# Patient Record
Sex: FEMALE | Race: WHITE | Employment: FULL TIME | URBAN - METROPOLITAN AREA
[De-identification: names, ages, dates, MRNs, and addresses within clinical notes are randomized per-mention and may not be internally consistent; named-entity substitution may affect disease eponyms.]

---

## 2021-08-02 ENCOUNTER — APPOINTMENT (OUTPATIENT)
Dept: PHYSICAL THERAPY | Age: 61
End: 2021-08-02

## 2021-08-16 ENCOUNTER — TRANSCRIBE ORDER (OUTPATIENT)
Dept: SCHEDULING | Age: 61
End: 2021-08-16

## 2021-08-16 DIAGNOSIS — R60.0 BILATERAL LOWER EXTREMITY EDEMA: Primary | ICD-10-CM

## 2022-05-25 ENCOUNTER — HOSPITAL ENCOUNTER (OUTPATIENT)
Dept: PHYSICAL THERAPY | Age: 62
Discharge: HOME | End: 2022-05-25
Payer: COMMERCIAL

## 2022-05-25 PROCEDURE — 97161 PT EVAL LOW COMPLEX 20 MIN: CPT | Performed by: PHYSICAL THERAPIST

## 2022-05-25 PROCEDURE — 97110 THERAPEUTIC EXERCISES: CPT | Performed by: PHYSICAL THERAPIST

## 2022-05-25 NOTE — PROGRESS NOTES
[address], [address] [place] [address] [place] [address]  Phone: [phone]   Fax: [phone]    PT INITIAL EVALUATION NOTE - [facility] 2-15  Plan of Care/Statement of Necessity for Physical Therapy Services  2-15    Patient Name: You Garza  Date:2022  : 1960  [x]  Patient  Verified  Provider#: [id number]  Payor: JUAQUIN CARDONA / Plan: Vivi Scott / Product Type: RUPA /    Referral source: Bradleyregaan Harris. Chelsi DO Raymon   In time:250 pm   Out time: 340 pm   Total Treatment Time (min):  50 minutes   Total Timed Codes (min):  10 minutes      Visit #: 1      Start of Care: 2022      Onset Date: 10/21/2022 s/p spinal fusion. Treatment Area/Diagnosis: Right foot drop [M21.371]  Other abnormalities of gait and mobility [R26.89]    SUBJECTIVE  Pain Level (0-10 scale): 7                Best: 4           Worst:  10 : Aggravating factors-   Description:  Numbness, tingling. Any medication changes, allergies to medications, adverse drug reactions, diagnosis change, or new procedure performed?: [] No    [x] Yes (see summary sheet for update)    Subjective:    Chief complaint of right dorsal foot pain ,hypersensitivity and deep pressure. Onset after spinal fusion 10/2021. Wears AFO most of the time. PLOF: patient reports she could walk 1/2 mile today. Mechanism of Injury: see above   Previous Treatment/Compliance: AF0  PMHx: CLL, HTN, melanoma, left ORIF lateral malleolus. Surgical Hx: see scanned. Prior Hospitalization: see medical history   Medications: Verified on Patient Summary List  Work Hx:  Full real estate  Living Situation:  Has help of a significant other. Not able to drive.    Pt Goals: walk longer distances,drive  Barriers: none  Motivation: good  Cognition: A & O x 4        OBJECTIVE/EXAMINATION  Ortho:   Observation: WN, slightly OW female  Posture:  Unremarkable  Functional Mobility:        Gait:  Without right AFO, foot slap. Stairs: + handrail , difficulty with reciprocal pattern       Balance:  Left: 10 seconds, right: unable   Palpation: very sensitive to touch over the right dorsal ankle. LE AROM:   Hip AROM:  Right      [] N/A  [x]WNL  []Minimal  []Moderate  [] Major              Left         [] N/A  [x]WNL  []Minimal  []Moderate  [] Major   Ankle DF:  Right: -5, left: 0     Strength:  RLE: Hip abductors:  3/4, ankle DF: 0, EV: -2/5, IV: trace. Toes:  Extension: trace. Flexion: 1/5         LLE:  WNL           10 min Therapeutic Exercise:  [x] See flow sheet :  Seated heel raises, DF band pull followed by PF, seated. Rationale: increase ROM and increase strength to improve the patients ability to move and stabilize ankle with weight bearing activities. With   [x] TE   [] TA   [] neuro   [] other: Patient Education: [x] Review HEP  , see above. [] Progressed/Changed HEP based on:   [] positioning   [] body mechanics   [] transfers   [] heat/ice application    [] other:    TUG: WNL     Pain Level (0-10 scale) post treatment: 7    ASSESSMENT/Key Information/Changes in Function:   Chief complaint of weakness/pain right ankle s/p spine fusion 10/2022. Objective findings include:  Gait abnormality and significant weakness right ankle. Will benefit from skilled PT intervention to facilitate his return to baseline. Problem List: pain affecting function, decrease ROM, decrease strength, impaired gait/ balance and decrease flexibility/ joint mobility    Short Term Goals: To be accomplished in 4 weeks:   1. Inpd with HEP              2. Passive ankle dorsiflexion: 5  Long Term Goals: To be accomplished in 8 weeks:   1. Active ankle DF, observable contraction. Patient Goal (s): walk normally without brace.      Evaluation Complexity History LOW Complexity : Zero comorbidities / personal factors that will impact the outcome / POC; Examination LOW Complexity : 1-2 Standardized tests and measures addressing body structure, function, activity limitation and / or participation in recreation  ;Presentation LOW Complexity : Stable, uncomplicated  ;Clinical Decision Making AM-PAC: 59.19% : Medium Complexity   Overall Complexity Rating: LOW      Patient / Family readiness to learn indicated by: asking questions and interest  Persons(s) to be included in education: patient (P)  Barriers to Learning/Limitations: None  Patient Self Reported Health Status: good  Rehabilitation Potential: good  Patient/ Caregiver education and instruction: exercises      PLAN:  Treatment Plan may include any combination of the following: Therapeutic exercise, Therapeutic activities, Neuromuscular re-education, Gait/balance training, Manual therapy and Patient education  HEP Issued: see above    Frequency / Duration: Patient to be seen 2 times per week for 8 weeks. [x]  Plan of care has been reviewed with PTA    Certification Period: 5/25/2022  to  8/25/2022 April Gualberto, PT 5/25/2022     ________________________________________________________________________    I certify that the above Therapy Services are being furnished while the patient is under my care. I agree with the treatment plan and certify that this therapy is necessary. [de-identified] Signature:____________________  Date:____________Time: _________            Rosa M Knapp

## 2022-06-02 ENCOUNTER — HOSPITAL ENCOUNTER (OUTPATIENT)
Dept: PHYSICAL THERAPY | Age: 62
Discharge: HOME | End: 2022-06-02
Payer: COMMERCIAL

## 2022-06-02 PROCEDURE — 97110 THERAPEUTIC EXERCISES: CPT | Performed by: PHYSICAL THERAPIST

## 2022-06-02 NOTE — PROGRESS NOTES
PT DAILY TREATMENT NOTE - [facility] 2-15    Patient Name: Aileen Loo  WEEC:[id number]  : 1960  [x]  Patient  Verified  Payor: Venu Pederson / Plan: Liza Arboleda / Product Type: RUPA /    In time: 1125 am  Out time:1150 am   Total Treatment Time (min): 25 minutes  Total Timed Codes (min): 23 minutes  1:1 Treatment Time ( [address] only): 23 minutes   Visit #:  2    Treatment Area: Right foot drop [M21.371]  Other abnormalities of gait and mobility [R26.89]    SUBJECTIVE  Pain Level (0-10 scale): 4. Any medication changes, allergies to medications, adverse drug reactions, diagnosis change, or new procedure performed?: [x] No    [] Yes (see summary sheet for update)  Subjective functional status/changes:   [] No changes reported  Pain level has been good today     OBJECTIVE      23  min Therapeutic Exercise:  [] See flow sheet :   Rationale: increase ROM, increase strength and improve balance to improve the patients ability to perform upright activities with less pain . With   [] TE   [] TA   [] neuro   [] other: Patient Education: [x] Review HEP  , ball/band. [] Progressed/Changed HEP based on:   [] positioning   [] body mechanics   [] transfers   [] heat/ice application    [] other:           Pain Level (0-10 scale) post treatment: 4    ASSESSMENT/Changes in Function:   Moderate fatigue with effort. Good form with exercises issued. Alternated sitting down and standing activities. Patient will continue to benefit from skilled PT services to modify and progress therapeutic interventions, address functional mobility deficits, address strength deficits and analyze and cue movement patterns to attain remaining goals. [x]  See Plan of Care  []  See progress note/recertification  []  See Discharge Summary         Progress towards goals / Updated goals:  Short Term Goals: To be accomplished in 4 weeks:              1. Inpd with HEP              2. Passive ankle dorsiflexion: 5  Long Term Goals:  To be accomplished in 8 weeks:              1.  Active ankle DF, observable contraction. Patient Goal (s): walk normally without brace.         PLAN  [x]  Upgrade activities as tolerated     [x]  Continue plan of care  []  Update interventions per flow sheet       []  Discharge due to:_  []  Other:_      April Joy-Rosa, PT 6/2/2022

## 2022-06-06 ENCOUNTER — HOSPITAL ENCOUNTER (OUTPATIENT)
Dept: PHYSICAL THERAPY | Age: 62
Discharge: HOME | End: 2022-06-06
Payer: COMMERCIAL

## 2022-06-06 PROCEDURE — 97530 THERAPEUTIC ACTIVITIES: CPT | Performed by: PHYSICAL THERAPIST

## 2022-06-06 PROCEDURE — 97110 THERAPEUTIC EXERCISES: CPT | Performed by: PHYSICAL THERAPIST

## 2022-06-06 NOTE — PROGRESS NOTES
PT DAILY TREATMENT NOTE - [facility] 2-15    Patient Name: Mansoor Leiva  Date:2022  : 1960  [x]  Patient  Verified  Payor: Jovita Miller / Plan: Corrinne Needs / Product Type: RUPA /    In time: 845 am  Out time: 935 am   Total Treatment Time (min):  50  minutes  Total Timed Codes (min):  40  minutes  1:1 Treatment Time ( only): 40  minutes   Visit #:  3    Treatment Area: Right foot drop [M21.371]  Other abnormalities of gait and mobility [R26.89]    SUBJECTIVE  Pain Level (0-10 scale): 4. Any medication changes, allergies to medications, adverse drug reactions, diagnosis change, or new procedure performed?: [x] No    [] Yes (see summary sheet for update)  Subjective functional status/changes:   [] No changes reported  WAS NOT TOO SORE AFTER THE LAST SESSION. MAYBE IN THE OF THE THIGHS. OBJECTIVE      20  min Therapeutic Exercise:  [] See flow sheet :   Rationale: increase ROM, increase strength and improve balance to improve the patients ability to perform upright activities with less pain . 20 min Therapeutic Activity:  []  See flow sheet :   Rationale: improve coordination and improve balance  to improve the patients ability to AMBULATE WITH A REDUCED FALL RISK          With   [] TE   [] TA   [] neuro   [] other: Patient Education: [x] Review HEP    [] Progressed/Changed HEP based on:   [] positioning   [] body mechanics   [] transfers   [] heat/ice application    [] other:           Pain Level (0-10 scale) post treatment: 4    ASSESSMENT/Changes in Function:   Moderate fatigue with effort. Good form with exercises issued. Alternated sitting down and standing activities. Patient will continue to benefit from skilled PT services to modify and progress therapeutic interventions, address functional mobility deficits, address strength deficits and analyze and cue movement patterns to attain remaining goals.      [x]  See Plan of Care  []  See progress note/recertification  []  See Discharge Summary         Progress towards goals / Updated goals:  Short Term Goals: To be accomplished in 4 weeks:              1. Inpd with HEP              2. Passive ankle dorsiflexion: 5  Long Term Goals: To be accomplished in 8 weeks:              1.  Active ankle DF, observable contraction. Patient Goal (s): walk normally without brace.         PLAN  [x]  Upgrade activities as tolerated     [x]  Continue plan of care  []  Update interventions per flow sheet       []  Discharge due to:_  []  Other:_      April Joy-Rosa, PT 6/6/2022

## 2022-06-08 ENCOUNTER — HOSPITAL ENCOUNTER (OUTPATIENT)
Dept: PHYSICAL THERAPY | Age: 62
Discharge: HOME | End: 2022-06-08
Payer: COMMERCIAL

## 2022-06-08 PROCEDURE — 97530 THERAPEUTIC ACTIVITIES: CPT | Performed by: PHYSICAL THERAPIST

## 2022-06-08 PROCEDURE — 97110 THERAPEUTIC EXERCISES: CPT | Performed by: PHYSICAL THERAPIST

## 2022-06-08 PROCEDURE — 97014 ELECTRIC STIMULATION THERAPY: CPT | Performed by: PHYSICAL THERAPIST

## 2022-06-08 NOTE — PROGRESS NOTES
PT DAILY TREATMENT NOTE - [facility] 2-15    Patient Name: Aileen Loo  Date:2022  : 1960  [x]  Patient  Verified  Payor: Venu Pederson / Plan: Liza Arboleda / Product Type: RUPA /      In time: 845  am  Out time: 955  am   Total Treatment Time (min):  50  minutes  Total Timed Codes (min):  40  minutes  1:1 Treatment Time ( only): 40  minutes   Visit #:  4    Treatment Area: Right foot drop [M21.371]  Other abnormalities of gait and mobility [R26.89]    SUBJECTIVE  Pain Level (0-10 scale): 4. Any medication changes, allergies to medications, adverse drug reactions, diagnosis change, or new procedure performed?: [x] No    [] Yes (see summary sheet for update)  Subjective functional status/changes:   [] No changes reported  No new complaints. Not particularly sore. OBJECTIVE        20  min Therapeutic Exercise:  [] See flow sheet :   Rationale: increase ROM, increase strength and improve balance to improve the patients ability to perform upright activities with less pain . 15  min Therapeutic Activity:  []  See flow sheet :   Rationale: improve coordination and improve balance  to improve the patients ability to AMBULATE WITH A REDUCED FALL RISK      10 min Neuromuscular Re-education:  []  See flow sheet :  NMES + AAROM into DF   Rationale: increase strength and increase proprioception  to improve the patients ability to engage anterior tibialis during swing. With   [] TE   [] TA   [] neuro   [] other: Patient Education: [x] Review HEP    [] Progressed/Changed HEP based on:   [] positioning   [] body mechanics   [] transfers   [] heat/ice application    [] other:         Pain Level (0-10 scale) post treatment: 4    ASSESSMENT/Changes in Function:    Moderate fatigue with effort. Good form with exercises issued. Alternated sitting down and standing activities. NMES with limited results.        Patient will continue to benefit from skilled PT services to modify and progress therapeutic interventions, address functional mobility deficits, address strength deficits and analyze and cue movement patterns to attain remaining goals. [x]  See Plan of Care  []  See progress note/recertification  []  See Discharge Summary         Progress towards goals / Updated goals:  Short Term Goals: To be accomplished in 4 weeks:              1. Inpd with HEP              2. Passive ankle dorsiflexion: 5  Long Term Goals: To be accomplished in 8 weeks:              1.  Active ankle DF, observable contraction. Patient Goal (s): walk normally without brace.         PLAN  [x]  Upgrade activities as tolerated     [x]  Continue plan of care  []  Update interventions per flow sheet       []  Discharge due to:_  []  Other:_      April Joy-Rosa, PT 6/8/2022

## 2022-06-13 ENCOUNTER — HOSPITAL ENCOUNTER (OUTPATIENT)
Dept: PHYSICAL THERAPY | Age: 62
Discharge: HOME | End: 2022-06-13
Payer: COMMERCIAL

## 2022-06-13 PROCEDURE — 97110 THERAPEUTIC EXERCISES: CPT | Performed by: PHYSICAL THERAPIST

## 2022-06-13 PROCEDURE — 97530 THERAPEUTIC ACTIVITIES: CPT | Performed by: PHYSICAL THERAPIST

## 2022-06-13 PROCEDURE — 97014 ELECTRIC STIMULATION THERAPY: CPT | Performed by: PHYSICAL THERAPIST

## 2022-06-13 NOTE — PROGRESS NOTES
PT DAILY TREATMENT NOTE - [facility] 2-15    Patient Name: Carito Bruce  Date:2022  : 1960  [x]  Patient  Verified  Payor: Waqas Caraballo / Plan: Narcisa Carmen / Product Type: RUPA /      In time: 900 am   Out time:  950 am   Total Treatment Time (min):  50  minutes  Total Timed Codes (min):  30  minutes  1:1 Treatment Time ( only): 30  minutes   Visit #:  5    Treatment Area: Right foot drop [M21.371]  Other abnormalities of gait and mobility [R26.89]    SUBJECTIVE  Pain Level (0-10 scale): 6. Any medication changes, allergies to medications, adverse drug reactions, diagnosis change, or new procedure performed?: [x] No    [] Yes (see summary sheet for update)  Subjective functional status/changes:   [] No changes reported    Significant back pain this am.     OBJECTIVE        15 min Therapeutic Exercise:  [] See flow sheet :   Rationale: increase ROM, increase strength and improve balance to improve the patients ability to perform upright activities with less pain . 15  min Therapeutic Activity:  []  See flow sheet :   Rationale: improve coordination and improve balance  to improve the patients ability to AMBULATE WITH A REDUCED FALL RISK      10 min Neuromuscular Re-education:  []  See flow sheet :  NMES + AAROM into DF   Rationale: increase strength and increase proprioception  to improve the patients ability to engage anterior tibialis during swing. With   [] TE   [] TA   [] neuro   [] other: Patient Education: [] Review HEP    [] Progressed/Changed HEP based on:   [] positioning   [] body mechanics   [] transfers   [] heat/ice application    [] other:         Pain Level (0-10 scale) post treatment: 4    ASSESSMENT/Changes in Function:    Moderate fatigue with effort. Good form with exercises issued. Alternated sitting down and standing activities. NMES with limited results.        Patient will continue to benefit from skilled PT services to modify and progress therapeutic interventions, address functional mobility deficits, address strength deficits and analyze and cue movement patterns to attain remaining goals. [x]  See Plan of Care  []  See progress note/recertification  []  See Discharge Summary         Progress towards goals / Updated goals:  Short Term Goals: To be accomplished in 4 weeks:              1. Inpd with HEP              2. Passive ankle dorsiflexion: 5  Long Term Goals: To be accomplished in 8 weeks:              1.  Active ankle DF, observable contraction. Patient Goal (s): walk normally without brace.         PLAN  [x]  Upgrade activities as tolerated     [x]  Continue plan of care  []  Update interventions per flow sheet       []  Discharge due to:_  []  Other:_      April Joy-Rosa, PT 6/13/2022

## 2022-06-15 ENCOUNTER — HOSPITAL ENCOUNTER (OUTPATIENT)
Dept: PHYSICAL THERAPY | Age: 62
Discharge: HOME | End: 2022-06-15
Payer: COMMERCIAL

## 2022-06-15 PROCEDURE — 97014 ELECTRIC STIMULATION THERAPY: CPT | Performed by: PHYSICAL THERAPIST

## 2022-06-15 PROCEDURE — 97530 THERAPEUTIC ACTIVITIES: CPT | Performed by: PHYSICAL THERAPIST

## 2022-06-15 PROCEDURE — 97110 THERAPEUTIC EXERCISES: CPT | Performed by: PHYSICAL THERAPIST

## 2022-06-15 NOTE — PROGRESS NOTES
PT DAILY TREATMENT NOTE - [facility] 2-15    Patient Name: Analisa Fitzgerald  Date:6/15/2022  : 1960  [x]  Patient  Verified  Payor: Myrtle Ayala / Plan: Marcial Fritz / Product Type: RUPA /      In time: 900 am   Out time:  955 am   Total Treatment Time (min):  55  minutes  Total Timed Codes (min):  28  minutes  1:1 Treatment Time ( only): 30  minutes   Visit #:  6    Treatment Area: Right foot drop [M21.371]  Other abnormalities of gait and mobility [R26.89]    SUBJECTIVE  Pain Level (0-10 scale): 4   Any medication changes, allergies to medications, adverse drug reactions, diagnosis change, or new procedure performed?: [x] No    [] Yes (see summary sheet for update)  Subjective functional status/changes:   [] No changes reported    Back is feeling better this am.  Not in a rush today. OBJECTIVE    15 min Therapeutic Exercise:  [] See flow sheet :   Rationale: increase ROM, increase strength and improve balance to improve the patients ability to perform upright activities with less pain . 13 min Therapeutic Activity:  []  See flow sheet :   Rationale: improve coordination and improve balance  to improve the patients ability to AMBULATE WITH A REDUCED FALL RISK      0 min Neuromuscular Re-education:  []  See flow sheet :  NMES + AAROM into DF   Rationale: increase strength and increase proprioception  to improve the patients ability to engage anterior tibialis during swing. With   [x] TE   [] TA   [] neuro   [] other: Patient Education: [x] Review HEP  , encouraged to do gastroc stretch at home. [] Progressed/Changed HEP based on:   [] positioning   [] body mechanics   [] transfers   [] heat/ice application    [] other:         Pain Level (0-10 scale) post treatment: 3    ASSESSMENT/Changes in Function:    Moderate fatigue with effort. Good form with exercises issued. Alternated sitting down and standing activities. NMES with limited results.        Patient will continue to benefit from skilled PT services to modify and progress therapeutic interventions, address functional mobility deficits, address strength deficits and analyze and cue movement patterns to attain remaining goals. [x]  See Plan of Care  []  See progress note/recertification  []  See Discharge Summary         Progress towards goals / Updated goals:  Short Term Goals: To be accomplished in 4 weeks:              1. Inpd with HEP              2. Passive ankle dorsiflexion: 5  Long Term Goals: To be accomplished in 8 weeks:              1.  Active ankle DF, observable contraction. Patient Goal (s): walk normally without brace.         PLAN  [x]  Upgrade activities as tolerated     [x]  Continue plan of care  []  Update interventions per flow sheet       []  Discharge due to:_  []  Other:_      April Joy-Rosa, PT 6/15/2022

## 2022-06-20 ENCOUNTER — HOSPITAL ENCOUNTER (OUTPATIENT)
Dept: PHYSICAL THERAPY | Age: 62
Discharge: HOME | End: 2022-06-20
Payer: COMMERCIAL

## 2022-06-20 PROCEDURE — 97530 THERAPEUTIC ACTIVITIES: CPT | Performed by: PHYSICAL THERAPIST

## 2022-06-20 PROCEDURE — 97110 THERAPEUTIC EXERCISES: CPT | Performed by: PHYSICAL THERAPIST

## 2022-06-20 NOTE — PROGRESS NOTES
PT DAILY TREATMENT NOTE - [facility] 2-15    Patient Name: Norman Epperson  Date:2022  : 1960  [x]  Patient  Verified  Payor: Martha Nunez / Plan: Allisonjsravi 97 / Product Type: RUPA /      In time: 900 am   Out time:  955 am   Total Treatment Time (min):  55  minutes  Total Timed Codes (min):  28  minutes  1:1 Treatment Time ( only): 30  minutes   Visit #:  7    Treatment Area: Right foot drop [M21.371]  Other abnormalities of gait and mobility [R26.89]    SUBJECTIVE  Pain Level (0-10 scale): 5   Any medication changes, allergies to medications, adverse drug reactions, diagnosis change, or new procedure performed?: [x] No    [] Yes (see summary sheet for update)  Subjective functional status/changes:   [] No changes reported    I am having a good day today. Getting mind set for surgery in 1 week. OBJECTIVE    29 min Therapeutic Exercise:  [] See flow sheet :   Rationale: increase ROM, increase strength and improve balance to improve the patients ability to perform upright activities with less pain . 13 min Therapeutic Activity:  []  See flow sheet :   Rationale: improve coordination and improve balance  to improve the patients ability to AMBULATE WITH A REDUCED FALL RISK      0 min Neuromuscular Re-education:  []  See flow sheet :  NMES + AAROM into DF   Rationale: increase strength and increase proprioception  to improve the patients ability to engage anterior tibialis during swing. With   [x] TE   [] TA   [] neuro   [] other: Patient Education: [x] Review HEP  , encouraged to do gastroc stretch at home. [] Progressed/Changed HEP based on:   [] positioning   [] body mechanics   [] transfers   [] heat/ice application    [] other:         Pain Level (0-10 scale) post treatment: 3    ASSESSMENT/Changes in Function:    Moderate fatigue with effort. Good form with exercises issued. Alternated sitting down and standing activities. NMES with limited results.        Patient will continue to benefit from skilled PT services to modify and progress therapeutic interventions, address functional mobility deficits, address strength deficits and analyze and cue movement patterns to attain remaining goals. [x]  See Plan of Care  []  See progress note/recertification  []  See Discharge Summary         Progress towards goals / Updated goals:  Short Term Goals: To be accomplished in 4 weeks:              1. Inpd with HEP, PROGRESSING               2. Passive ankle dorsiflexion: 5  Long Term Goals: To be accomplished in 8 weeks:              1.  Active ankle DF, observable contraction. Patient Goal (s): walk normally without brace.         PLAN  [x]  Upgrade activities as tolerated     [x]  Continue plan of care  []  Update interventions per flow sheet       []  Discharge due to:_  []  Other:_      April Joy-Rosa, PT 6/20/2022

## 2022-06-22 ENCOUNTER — HOSPITAL ENCOUNTER (OUTPATIENT)
Dept: PHYSICAL THERAPY | Age: 62
Discharge: HOME | End: 2022-06-22
Payer: COMMERCIAL

## 2022-06-22 PROCEDURE — 97110 THERAPEUTIC EXERCISES: CPT | Performed by: PHYSICAL THERAPIST

## 2022-06-22 PROCEDURE — 97014 ELECTRIC STIMULATION THERAPY: CPT | Performed by: PHYSICAL THERAPIST

## 2022-06-22 NOTE — PROGRESS NOTES
PT DAILY TREATMENT NOTE - [facility] 2-15    Patient Name: Dameon Constantino  Date:2022  : 1960  [x]  Patient  Verified  Payor: Jennifer Elias / Plan: Marcelino Rendon / Product Type: RUPA /      In time: 930 am   Out time:  2751 am   Total Treatment Time (min):  55minutes  Total Timed Codes (min):  45  minutes  1:1 Treatment Time ( only): 30  minutes   Visit #:  8    Treatment Area: Right foot drop [M21.371]  Other abnormalities of gait and mobility [R26.89]    SUBJECTIVE  Pain Level (0-10 scale): 5   Any medication changes, allergies to medications, adverse drug reactions, diagnosis change, or new procedure performed?: [x] No    [] Yes (see summary sheet for update)  Subjective functional status/changes:   [] No changes reported    This is my last day. I am having surgery this coming Monday. OBJECTIVE    NMES: 10:10, 50 pps, over anterior tibialis. X 10 minutes. Proprioception / biofeedback to facilitate active movement. 30 min Therapeutic Exercise:  [] See flow sheet :   Rationale: increase ROM, increase strength and improve balance to improve the patients ability to perform upright activities with less pain . 0 min Therapeutic Activity:  []  See flow sheet :   Rationale: improve coordination and improve balance  to improve the patients ability to AMBULATE WITH A REDUCED FALL RISK      0 min Neuromuscular Re-education:  []  See flow sheet :  NMES + AAROM into DF   Rationale: increase strength and increase proprioception  to improve the patients ability to engage anterior tibialis during swing. With   [x] TE   [] TA   [] neuro   [] other: Patient Education: [x] Review HEP  , encouraged to do gastroc stretch at home. [] Progressed/Changed HEP based on:   [] positioning   [] body mechanics   [] transfers   [] heat/ice application    [] other:         Pain Level (0-10 scale) post treatment: 5    ASSESSMENT/Changes in Function:    Moderate fatigue with effort.    Good form with exercises issued. Alternated sitting down and standing activities. NMES with limited results. Hold PT pending f/u post spine surgery. Patient will continue to benefit from skilled PT services to modify and progress therapeutic interventions, address functional mobility deficits, address strength deficits and analyze and cue movement patterns to attain remaining goals. [x]  See Plan of Care  []  See progress note/recertification  []  See Discharge Summary         Progress towards goals / Updated goals:  Short Term Goals: To be accomplished in 4 weeks:              1. Inpd with HEP, PROGRESSING               2. Passive ankle dorsiflexion: 5  Long Term Goals: To be accomplished in 8 weeks:              1.  Active ankle DF, observable contraction. Patient Goal (s): walk normally without brace.         PLAN  []  Upgrade activities as tolerated     []  Continue plan of care  []  Update interventions per flow sheet       [x]  Discharge due to: upcoming spine surgery   []  Other:_      April Joy-Rosa, PT 6/22/2022

## 2022-07-26 NOTE — PROGRESS NOTES
[address], [address] [facility]  Phone: [phone]   Fax: [phone]    Discharge Summary  2-15    Patient name: Makeda Glez  : 1960  Provider#: [id number]  Referral source: Jenelle Haro. Rc Conn DO      Medical/Treatment Diagnosis: Right foot drop [M21.371]  Other abnormalities of gait and mobility [R26.89]     Prior Hospitalization: see medical history     Comorbidities: See Plan of Care  Prior Level of Function:See Plan of Care  Medications: Verified on Patient Summary List    Start of Care: 2022      Onset Date:10/21/2021   Visits from Start of Care: 8     Missed Visits: 0  Reporting Period : 2022 to 2022      ASSESSMENT/SUMMARY OF CARE: gait, ROM, strengthening , balance for right foot drop    FOTO Functional Measure: 60%/100  Discharge  60%/100  Intake      Progress towards goals / Updated goals:  Short Term Goals: To be accomplished in 4 weeks:              1. Inpd with HEP, PROGRESSING              2. Passive ankle dorsiflexion: 5, NOT MET  Long Term Goals: To be accomplished in 8 weeks:              1.  Active ankle DF, observable contraction.  , NOT MET  Patient Goal (s): walk normally without brace.  , NOT MET           RECOMMENDATIONS:  [x]Discontinue therapy: []Patient has reached or is progressing toward set goals      []Patient is non-compliant or has abdicated      []Due to lack of appreciable progress towards set goals      [x]Other  Patient is having additional back surgery     Hanna Burciaga, PT 2022

## 2022-08-18 ENCOUNTER — APPOINTMENT (OUTPATIENT)
Dept: CT IMAGING | Age: 62
End: 2022-08-18
Attending: NURSE PRACTITIONER
Payer: COMMERCIAL

## 2022-08-18 ENCOUNTER — HOSPITAL ENCOUNTER (EMERGENCY)
Age: 62
Discharge: HOME | End: 2022-08-18
Attending: EMERGENCY MEDICINE
Payer: COMMERCIAL

## 2022-08-18 ENCOUNTER — APPOINTMENT (OUTPATIENT)
Dept: GENERAL RADIOLOGY | Age: 62
End: 2022-08-18
Attending: EMERGENCY MEDICINE
Payer: COMMERCIAL

## 2022-08-18 ENCOUNTER — APPOINTMENT (OUTPATIENT)
Dept: GENERAL RADIOLOGY | Age: 62
End: 2022-08-18
Attending: NURSE PRACTITIONER
Payer: COMMERCIAL

## 2022-08-18 VITALS
WEIGHT: 215 LBS | RESPIRATION RATE: 20 BRPM | SYSTOLIC BLOOD PRESSURE: 152 MMHG | DIASTOLIC BLOOD PRESSURE: 93 MMHG | OXYGEN SATURATION: 100 % | BODY MASS INDEX: 32.58 KG/M2 | TEMPERATURE: 98.5 F | HEIGHT: 68 IN | HEART RATE: 104 BPM

## 2022-08-18 DIAGNOSIS — R11.0 NAUSEA WITHOUT VOMITING: Primary | ICD-10-CM

## 2022-08-18 DIAGNOSIS — R59.0 ENLARGED LYMPH NODES IN ARMPIT: ICD-10-CM

## 2022-08-18 DIAGNOSIS — R06.02 SOB (SHORTNESS OF BREATH): ICD-10-CM

## 2022-08-18 LAB
ALBUMIN SERPL-MCNC: 3.9 G/DL (ref 3.5–5)
ALBUMIN/GLOB SERPL: 1.2 {RATIO} (ref 1.1–2.2)
ALP SERPL-CCNC: 91 U/L (ref 45–117)
ALT SERPL-CCNC: 28 U/L (ref 12–78)
ANION GAP SERPL CALC-SCNC: 9 MMOL/L (ref 5–15)
AST SERPL W P-5'-P-CCNC: 21 U/L (ref 15–37)
BASOPHILS # BLD: 0 K/UL (ref 0–0.1)
BASOPHILS NFR BLD: 0 % (ref 0–1)
BILIRUB SERPL-MCNC: 0.2 MG/DL (ref 0.2–1)
BUN SERPL-MCNC: 14 MG/DL (ref 6–20)
BUN/CREAT SERPL: 18 (ref 12–20)
CA-I BLD-MCNC: 9.6 MG/DL (ref 8.5–10.1)
CHLORIDE SERPL-SCNC: 105 MMOL/L (ref 97–108)
CO2 SERPL-SCNC: 27 MMOL/L (ref 21–32)
CREAT SERPL-MCNC: 0.79 MG/DL (ref 0.55–1.02)
DIFFERENTIAL METHOD BLD: ABNORMAL
EOSINOPHIL # BLD: 0.1 K/UL (ref 0–0.4)
EOSINOPHIL NFR BLD: 1 % (ref 0–7)
ERYTHROCYTE [DISTWIDTH] IN BLOOD BY AUTOMATED COUNT: 13.7 % (ref 11.5–14.5)
GLOBULIN SER CALC-MCNC: 3.2 G/DL (ref 2–4)
GLUCOSE SERPL-MCNC: 105 MG/DL (ref 65–100)
HCT VFR BLD AUTO: 30.7 % (ref 35–47)
HGB BLD-MCNC: 9.6 G/DL (ref 11.5–16)
IMM GRANULOCYTES # BLD AUTO: 0 K/UL
IMM GRANULOCYTES NFR BLD AUTO: 0 %
LYMPHOCYTES # BLD: 2.2 K/UL (ref 0.8–3.5)
LYMPHOCYTES NFR BLD: 20 % (ref 12–49)
MCH RBC QN AUTO: 26.6 PG (ref 26–34)
MCHC RBC AUTO-ENTMCNC: 31.3 G/DL (ref 30–36.5)
MCV RBC AUTO: 85 FL (ref 80–99)
MONOCYTES # BLD: 0.7 K/UL (ref 0–1)
MONOCYTES NFR BLD: 6 % (ref 5–13)
NEUTS SEG # BLD: 6.5 K/UL (ref 1.8–8)
NEUTS SEG NFR BLD: 58 % (ref 32–75)
OTHER CELLS NFR BLD MANUAL: 15 %
PLATELET # BLD AUTO: 312 K/UL (ref 150–400)
PLATELET COMMENTS,PCOM: ABNORMAL
PMV BLD AUTO: 9.9 FL (ref 8.9–12.9)
POTASSIUM SERPL-SCNC: 3.6 MMOL/L (ref 3.5–5.1)
PROT SERPL-MCNC: 7.1 G/DL (ref 6.4–8.2)
RBC # BLD AUTO: 3.61 M/UL (ref 3.8–5.2)
RBC MORPH BLD: ABNORMAL
SODIUM SERPL-SCNC: 141 MMOL/L (ref 136–145)
TROPONIN-HIGH SENSITIVITY: 8 NG/L (ref 0–51)
WBC # BLD AUTO: 11.2 K/UL (ref 3.6–11)
WBC MORPH BLD: ABNORMAL

## 2022-08-18 PROCEDURE — 71275 CT ANGIOGRAPHY CHEST: CPT

## 2022-08-18 PROCEDURE — 74011250636 HC RX REV CODE- 250/636

## 2022-08-18 PROCEDURE — 85025 COMPLETE CBC W/AUTO DIFF WBC: CPT

## 2022-08-18 PROCEDURE — 96374 THER/PROPH/DIAG INJ IV PUSH: CPT

## 2022-08-18 PROCEDURE — 96375 TX/PRO/DX INJ NEW DRUG ADDON: CPT

## 2022-08-18 PROCEDURE — 84484 ASSAY OF TROPONIN QUANT: CPT

## 2022-08-18 PROCEDURE — 99285 EMERGENCY DEPT VISIT HI MDM: CPT

## 2022-08-18 PROCEDURE — 93005 ELECTROCARDIOGRAM TRACING: CPT

## 2022-08-18 PROCEDURE — 36415 COLL VENOUS BLD VENIPUNCTURE: CPT

## 2022-08-18 PROCEDURE — 80053 COMPREHEN METABOLIC PANEL: CPT

## 2022-08-18 PROCEDURE — 74011250636 HC RX REV CODE- 250/636: Performed by: NURSE PRACTITIONER

## 2022-08-18 PROCEDURE — 74011000636 HC RX REV CODE- 636: Performed by: EMERGENCY MEDICINE

## 2022-08-18 RX ORDER — DIPHENHYDRAMINE HYDROCHLORIDE 50 MG/ML
50 INJECTION, SOLUTION INTRAMUSCULAR; INTRAVENOUS ONCE
Status: COMPLETED | OUTPATIENT
Start: 2022-08-18 | End: 2022-08-18

## 2022-08-18 RX ORDER — METOCLOPRAMIDE HYDROCHLORIDE 5 MG/ML
10 INJECTION INTRAMUSCULAR; INTRAVENOUS
Status: COMPLETED | OUTPATIENT
Start: 2022-08-18 | End: 2022-08-18

## 2022-08-18 RX ORDER — HALOPERIDOL 5 MG/ML
5 INJECTION INTRAMUSCULAR ONCE
Status: COMPLETED | OUTPATIENT
Start: 2022-08-18 | End: 2022-08-18

## 2022-08-18 RX ORDER — HALOPERIDOL 5 MG/1
5 TABLET ORAL ONCE
Status: DISCONTINUED | OUTPATIENT
Start: 2022-08-18 | End: 2022-08-18 | Stop reason: SDUPTHER

## 2022-08-18 RX ORDER — PROMETHAZINE HYDROCHLORIDE 25 MG/1
25 SUPPOSITORY RECTAL
Qty: 12 SUPPOSITORY | Refills: 0 | Status: SHIPPED | OUTPATIENT
Start: 2022-08-18 | End: 2022-08-25

## 2022-08-18 RX ORDER — DEXAMETHASONE SODIUM PHOSPHATE 100 MG/10ML
10 INJECTION INTRAMUSCULAR; INTRAVENOUS
Status: DISCONTINUED | OUTPATIENT
Start: 2022-08-18 | End: 2022-08-18

## 2022-08-18 RX ORDER — ONDANSETRON 2 MG/ML
4 INJECTION INTRAMUSCULAR; INTRAVENOUS
Status: COMPLETED | OUTPATIENT
Start: 2022-08-18 | End: 2022-08-18

## 2022-08-18 RX ORDER — ONDANSETRON 2 MG/ML
4 INJECTION INTRAMUSCULAR; INTRAVENOUS
Status: DISCONTINUED | OUTPATIENT
Start: 2022-08-18 | End: 2022-08-18 | Stop reason: SDUPTHER

## 2022-08-18 RX ORDER — DEXAMETHASONE SODIUM PHOSPHATE 10 MG/ML
INJECTION INTRAMUSCULAR; INTRAVENOUS
Status: COMPLETED
Start: 2022-08-18 | End: 2022-08-18

## 2022-08-18 RX ORDER — ONDANSETRON 4 MG/1
4 TABLET, ORALLY DISINTEGRATING ORAL
Qty: 10 TABLET | Refills: 0 | Status: SHIPPED | OUTPATIENT
Start: 2022-08-18

## 2022-08-18 RX ORDER — DEXAMETHASONE SODIUM PHOSPHATE 10 MG/ML
10 INJECTION INTRAMUSCULAR; INTRAVENOUS ONCE
Status: COMPLETED | OUTPATIENT
Start: 2022-08-18 | End: 2022-08-18

## 2022-08-18 RX ADMIN — IOPAMIDOL 100 ML: 755 INJECTION, SOLUTION INTRAVENOUS at 19:15

## 2022-08-18 RX ADMIN — METOCLOPRAMIDE 10 MG: 5 INJECTION, SOLUTION INTRAMUSCULAR; INTRAVENOUS at 20:03

## 2022-08-18 RX ADMIN — DEXAMETHASONE SODIUM PHOSPHATE 10 MG: 10 INJECTION INTRAMUSCULAR; INTRAVENOUS at 20:48

## 2022-08-18 RX ADMIN — HALOPERIDOL LACTATE 5 MG: 5 INJECTION, SOLUTION INTRAMUSCULAR at 20:48

## 2022-08-18 RX ADMIN — DEXAMETHASONE SODIUM PHOSPHATE 10 MG: 10 INJECTION, SOLUTION INTRAMUSCULAR; INTRAVENOUS at 20:48

## 2022-08-18 RX ADMIN — SODIUM CHLORIDE 1000 ML: 9 INJECTION, SOLUTION INTRAVENOUS at 20:49

## 2022-08-18 RX ADMIN — ONDANSETRON 4 MG: 2 INJECTION INTRAMUSCULAR; INTRAVENOUS at 18:14

## 2022-08-18 RX ADMIN — DIPHENHYDRAMINE HYDROCHLORIDE 50 MG: 50 INJECTION INTRAMUSCULAR; INTRAVENOUS at 18:56

## 2022-08-18 NOTE — ED PROVIDER NOTES
EMERGENCY DEPARTMENT HISTORY AND PHYSICAL EXAM      Date: 8/18/2022  Patient Name: Beth Lee      History of Presenting Illness     Chief Complaint   Patient presents with    Nausea    Shortness of Breath       History Provided By: Patient    HPI: Beth Lee, 59 y.o. female with a past medical history significant hypertension and GERD, CLL  presents to the ED with cc of episode of nausea with shortness of breath. She reports symptoms presented today reports has tested herself for COVID-19 with negative results. She does admit to recent surgery on June 27 with spinal fusion. She reports using compazine at home with minimal improvement in symptoms. She reports a similar episode 2 weeks ago that passed after use of medication. She denies any fever, chills, chest pain, abdominal pain, vomiting, urinary frequency, urgency. There are no other complaints, changes, or physical findings at this time. PCP: Harjeet Bedolla MD    Current Facility-Administered Medications   Medication Dose Route Frequency Provider Last Rate Last Admin    dexamethasone (PF) (DECADRON) 10 mg/mL injection             sodium chloride 0.9 % bolus infusion 1,000 mL  1,000 mL IntraVENous ONCE Viviane George NP        haloperidol lactate (HALDOL) injection 5 mg  5 mg IntraVENous ONCE Viviane George NP        dexamethasone (PF) (DECADRON) 10 mg/mL injection 10 mg  10 mg IntraVENous ONCE Gurinder George NP         Current Outpatient Medications   Medication Sig Dispense Refill    promethazine (PHENERGAN) 25 mg suppository Insert 1 Suppository into rectum every six (6) hours as needed for Nausea for up to 7 days. 12 Suppository 0    ondansetron (ZOFRAN ODT) 4 mg disintegrating tablet Take 1 Tablet by mouth every eight (8) hours as needed for Nausea or Vomiting.  10 Tablet 0       Past History   Past Medical History:  Past Medical History:   Diagnosis Date    CLL (chronic lymphocytic leukemia) (HCC)     GERD (gastroesophageal reflux disease)     Hypertension     Melanoma ([facility] [address]        Past Surgical History:  Past Surgical History:   Procedure Laterality Date    HX LUMBAR FUSION         Family History:  History reviewed. No pertinent family history. Social History:  Social History     Tobacco Use    Smoking status: Never    Smokeless tobacco: Never       Allergies: Allergies   Allergen Reactions    Iodinated Contrast Media Other (comments)    Tramadol Itching     Review of Systems   Review of Systems   Constitutional:  Negative for chills and fever. HENT:  Negative for congestion, sinus pressure and sinus pain. Respiratory:  Positive for shortness of breath. Negative for cough. Cardiovascular:  Negative for chest pain and leg swelling. Gastrointestinal:  Positive for nausea. Negative for abdominal pain and vomiting. Genitourinary:  Negative for dysuria, frequency and urgency. Musculoskeletal:  Negative for arthralgias and myalgias. Skin: Negative. Neurological:  Negative for dizziness, weakness, light-headedness, numbness and headaches. Psychiatric/Behavioral: Negative. All other systems reviewed and are negative. Physical Exam   Physical Exam  Vitals and nursing note reviewed. Constitutional:       General: She is not in acute distress. Appearance: Normal appearance. She is normal weight. She is not ill-appearing or toxic-appearing. HENT:      Head: Normocephalic and atraumatic. Right Ear: Hearing normal.      Left Ear: Hearing normal.      Nose: Nose normal.      Mouth/Throat:      Mouth: Mucous membranes are moist.   Eyes:      General: Lids are normal.      Extraocular Movements: Extraocular movements intact. Pupils: Pupils are equal, round, and reactive to light. Cardiovascular:      Rate and Rhythm: Normal rate and regular rhythm. Pulses: Normal pulses. Radial pulses are 2+ on the right side and 2+ on the left side.         Dorsalis pedis pulses are 2+ on the right side and 2+ on the left side. Pulmonary:      Effort: Pulmonary effort is normal. No accessory muscle usage or respiratory distress. Breath sounds: Normal breath sounds. No wheezing or rhonchi. Abdominal:      General: Bowel sounds are normal.      Palpations: Abdomen is soft. Tenderness: There is no abdominal tenderness. There is no right CVA tenderness or left CVA tenderness. Musculoskeletal:         General: Normal range of motion. Cervical back: Normal range of motion and neck supple. No muscular tenderness. Right lower leg: No edema. Left lower leg: No edema. Feet:      Right foot:      Skin integrity: No skin breakdown. Left foot:      Skin integrity: No skin breakdown. Skin:     General: Skin is warm and dry. Capillary Refill: Capillary refill takes less than 2 seconds. Findings: No abrasion, bruising, ecchymosis, erythema or signs of injury. Neurological:      Mental Status: She is alert and oriented to person, place, and time. GCS: GCS eye subscore is 4. GCS verbal subscore is 5. GCS motor subscore is 6. Cranial Nerves: Cranial nerves are intact. Sensory: Sensation is intact. Coordination: Coordination is intact. Gait: Gait is intact. Comments: Ambulates with cane at baseline    Psychiatric:         Attention and Perception: Attention normal.         Mood and Affect: Mood normal.         Behavior: Behavior normal. Behavior is cooperative.          Cognition and Memory: Cognition normal.       Lab and Diagnostic Study Results   Labs -     Recent Results (from the past 12 hour(s))   CBC WITH AUTOMATED DIFF    Collection Time: 08/18/22  6:00 PM   Result Value Ref Range    WBC 11.2 (H) 3.6 - 11.0 K/uL    RBC 3.61 (L) 3.80 - 5.20 M/uL    HGB 9.6 (L) 11.5 - 16.0 g/dL    HCT 30.7 (L) 35.0 - 47.0 %    MCV 85.0 80.0 - 99.0 FL    MCH 26.6 26.0 - 34.0 PG    MCHC 31.3 30.0 - 36.5 g/dL    RDW 13.7 11.5 - 14.5 %    PLATELET 312 150 - 400 K/uL    MPV 9.9 8.9 - 12.9 FL    NEUTROPHILS 58 32 - 75 %    LYMPHOCYTES 20 12 - 49 %    MONOCYTES 6 5 - 13 %    EOSINOPHILS 1 0 - 7 %    BASOPHILS 0 0 - 1 %    OTHER CELL 15 %    IMMATURE GRANULOCYTES 0 %    ABS. NEUTROPHILS 6.5 1.8 - 8.0 K/UL    ABS. LYMPHOCYTES 2.2 0.8 - 3.5 K/UL    ABS. MONOCYTES 0.7 0.0 - 1.0 K/UL    ABS. EOSINOPHILS 0.1 0.0 - 0.4 K/UL    ABS. BASOPHILS 0.0 0.0 - 0.1 K/UL    ABS. IMM. GRANS. 0.0 K/UL    DF Manual      PLATELET COMMENTS Large Platelets      RBC COMMENTS Anisocytosis  2+        WBC COMMENTS Toxic Granulation     METABOLIC PANEL, COMPREHENSIVE    Collection Time: 08/18/22  6:00 PM   Result Value Ref Range    Sodium 141 136 - 145 mmol/L    Potassium 3.6 3.5 - 5.1 mmol/L    Chloride 105 97 - 108 mmol/L    CO2 27 21 - 32 mmol/L    Anion gap 9 5 - 15 mmol/L    Glucose 105 (H) 65 - 100 mg/dL    BUN 14 6 - 20 mg/dL    Creatinine 0.79 0.55 - 1.02 mg/dL    BUN/Creatinine ratio 18 12 - 20      GFR est AA >60 >60 ml/min/1.73m2    GFR est non-AA >60 >60 ml/min/1.73m2    Calcium 9.6 8.5 - 10.1 mg/dL    Bilirubin, total 0.2 0.2 - 1.0 mg/dL    AST (SGOT) 21 15 - 37 U/L    ALT (SGPT) 28 12 - 78 U/L    Alk. phosphatase 91 45 - 117 U/L    Protein, total 7.1 6.4 - 8.2 g/dL    Albumin 3.9 3.5 - 5.0 g/dL    Globulin 3.2 2.0 - 4.0 g/dL    A-G Ratio 1.2 1.1 - 2.2     TROPONIN-HIGH SENSITIVITY    Collection Time: 08/18/22  6:00 PM   Result Value Ref Range    Troponin-High Sensitivity 8 0 - 51 ng/L       Radiologic Studies -   [unfilled]  CT Results  (Last 48 hours)                 08/18/22 1924  CTA CHEST W OR W WO CONT Final result    Impression:      1. No evidence of pulmonary embolism. No evidence of acute process in the chest.   2. Multiple borderline enlarged bilateral axillary lymph nodes, which may be   reactive or related to known history of CLL. Comparison with prior imaging if   available is recommended to assess for stability. 3. Moderate calcification of the LAD. Narrative:  EXAM:  CTA CHEST W OR W WO CONT       INDICATION: SOB, tachycardia, recent lumbar surgery, concern for PE       COMPARISON: None. CONTRAST:  100 mL of Isovue-370.   ? Technique: Following the uneventful intravenous administration of contrast, thin   axial images were obtained through the chest. Coronal and sagittal   reconstructions were generated. MIP image reconstructions were also performed. CT dose reduction was achieved through use of a standardized protocol tailored   for this examination and automatic exposure control for dose modulation. ? Findings:   Vascular:   No evidence of acute or chronic pulmonary embolism. The pulmonary arteries are   normal in size. The thoracic aorta is normal in size. Chest:   Lungs/Pleura: No evidence of consolidation, pleural effusion, or pneumothorax. No suspicious pulmonary nodules. Axilla/Soft Tissue: No pathologic axillary adenopathy. There are multiple   borderline enlarged bilateral axillary lymph nodes, largest measuring 10 mm   short axis. Mediastinum: Left upper extremity PICC with tip terminating in the proximal SVC. The heart is normal in size. No pericardial effusion. Moderate calcification of   the LAD. No pathologic mediastinal or hilar adenopathy. Calcified right hilar   lymph nodes noted. Upper Abdomen: The visualized upper abdomen appears normal.       Bones: No evidence of acute fracture, dislocation, or aggressive osseous   abnormality. Partially visualized thoracolumbar posterior spinal fusion hardware   extending superiorly to T10. CXR Results  (Last 48 hours)      None            Medical Decision Making and ED Course   - I am the first and primary provider for this patient AND AM THE PRIMARY PROVIDER OF RECORD. I reviewed the vital signs, available nursing notes, past medical history, past surgical history, family history and social history. - Initial assessment performed.  The patients presenting problems have been discussed, and the staff are in agreement with the care plan formulated and outlined with them. I have encouraged them to ask questions as they arise throughout their visit. Differential Diagnosis & Medical Decision Making Provider Note: Gastroenteritis, gastritis, intractable nausea and vomiting, pulmonary embolism, dehydration    Patient afebrile initially not tachycardic SPO2 100% on room air. Lungs clear to auscultation no rales rhonchi or wheezing noted. Basic labs with mild anemia, troponin within normal limits, CTA negative for any PE. Pt advised of enlarged lymph nodes noted to bilateral axillary region. No signs of dehydration noted. Patient initially felt improvement status post medication however after CTA developed worsening nausea and now vomiting. Additional medications given, IV fluids, observation by Dr. Dulce Mitchell who will determine patient's discharge status. Rx for Phenergan and Zofran given at home. Patient and spouse updated on plan of care. Verbalized understanding. Stable at  this time     Vital Signs-Reviewed the patient's vital signs. Patient Vitals for the past 12 hrs:   Temp Pulse Resp BP SpO2   08/18/22 2006 -- (!) 109 20 (!) 152/93 99 %   08/18/22 1929 -- 97 18 (!) 156/85 100 %   08/18/22 1750 98.6 °F (37 °C) 100 18 (!) 172/94 100 %       EKG interpretation: (Preliminary): Performed at 1800. NSR @ 94  ED Course:            Procedures and Critical Care       NOTES   :  I have spent the above critical care time involved in lab review, consultations with specialist, family decision- making, bedside attention and documentation. This time excludes time spent in any separate billed procedures. During this entire length of time I was immediately available to the patient . Slime Holder NP    Disposition   Disposition: DC- Adult Discharges: All of the diagnostic tests were reviewed and questions answered.  Diagnosis, care plan and treatment options were discussed. The patient understands the instructions and will follow up as directed. The patients results have been reviewed with them. They have been counseled regarding their diagnosis. The patient verbally convey understanding and agreement of the signs, symptoms, diagnosis, treatment and prognosis and additionally agrees to follow up as recommended with their PCP in 24 - 48 hours. They also agree with the care-plan and convey that all of their questions have been answered. I have also put together some discharge instructions for them that include: 1) educational information regarding their diagnosis, 2) how to care for their diagnosis at home, as well a 3) list of reasons why they would want to return to the ED prior to their follow-up appointment, should their condition change. Discharged    DISCHARGE PLAN:  1. There are no discharge medications for this patient. 2.   Follow-up Information       Follow up With Specialties Details Why Contact Info    Katherine Sim MD [facility] Schedule an appointment as soon as possible for a visit in 2 days As needed, If symptoms worsen [address] [place] [address]  [phone]            3. Return to ED if worse   4. Current Discharge Medication List        START taking these medications    Details   promethazine (PHENERGAN) 25 mg suppository Insert 1 Suppository into rectum every six (6) hours as needed for Nausea for up to 7 days. Qty: 12 Suppository, Refills: 0  Start date: 8/18/2022, End date: 8/25/2022      ondansetron (ZOFRAN ODT) 4 mg disintegrating tablet Take 1 Tablet by mouth every eight (8) hours as needed for Nausea or Vomiting. Qty: 10 Tablet, Refills: 0  Start date: 8/18/2022             Diagnosis/Clinical Impression     Clinical Impression:   1. Nausea without vomiting    2. SOB (shortness of breath)    3.  Enlarged lymph nodes in armpit        Attestations: IJamila Gordon, NP, am the primary clinician of record. Please note that this dictation was completed with [employer], the computer voice recognition software. Quite often unanticipated grammatical, syntax, homophones, and other interpretive errors are inadvertently transcribed by the computer software. Please disregard these errors. Please excuse any errors that have escaped final proofreading. Thank you.

## 2022-08-18 NOTE — Clinical Note
[address] EMERGENCY DEPARTMENT  [address] [facility] [address]  [phone]    Work/School Note    Date: 8/18/2022    To Whom It May concern:      Andrews Garza was seen and treated today in the emergency room by the following provider(s):  Attending Provider: Alfredo Beverly MD  Nurse Practitioner: Macho Segura NP. Andrews Garza is excused from work/school on 08/18/22. She is clear to return to work/school on 08/19/22.         Sincerely,          Andrew Crooks NP

## 2022-08-18 NOTE — ED TRIAGE NOTES
Reports nausea and shortness of breath starting around 1500. Reports being seen by home health due to spinal fusion and abx related to spinal infection. Negative home covid swab. Spinal surgery late June.

## 2022-08-19 NOTE — DISCHARGE INSTRUCTIONS
Thank you! Thank you for allowing me to care for you in the emergency department. It is my goal to provide you with excellent care. If you have not received excellent quality care, please ask to speak to the nurse manager. Please fill out the survey that will come to you by mail or email since we listen to your feedback! Below you will find a list of your tests from today's visit. Should you have any questions, please do not hesitate to call the emergency department. Labs  Recent Results (from the past 12 hour(s))   CBC WITH AUTOMATED DIFF    Collection Time: 08/18/22  6:00 PM   Result Value Ref Range    WBC 11.2 (H) 3.6 - 11.0 K/uL    RBC 3.61 (L) 3.80 - 5.20 M/uL    HGB 9.6 (L) 11.5 - 16.0 g/dL    HCT 30.7 (L) 35.0 - 47.0 %    MCV 85.0 80.0 - 99.0 FL    MCH 26.6 26.0 - 34.0 PG    MCHC 31.3 30.0 - 36.5 g/dL    RDW 13.7 11.5 - 14.5 %    PLATELET 742 269 - 511 K/uL    MPV 9.9 8.9 - 12.9 FL    NEUTROPHILS 58 32 - 75 %    LYMPHOCYTES 20 12 - 49 %    MONOCYTES 6 5 - 13 %    EOSINOPHILS 1 0 - 7 %    BASOPHILS 0 0 - 1 %    OTHER CELL 15 %    IMMATURE GRANULOCYTES 0 %    ABS. NEUTROPHILS 6.5 1.8 - 8.0 K/UL    ABS. LYMPHOCYTES 2.2 0.8 - 3.5 K/UL    ABS. MONOCYTES 0.7 0.0 - 1.0 K/UL    ABS. EOSINOPHILS 0.1 0.0 - 0.4 K/UL    ABS. BASOPHILS 0.0 0.0 - 0.1 K/UL    ABS. IMM.  GRANS. 0.0 K/UL    DF Manual      PLATELET COMMENTS Large Platelets      RBC COMMENTS Anisocytosis  2+        WBC COMMENTS Toxic Granulation     METABOLIC PANEL, COMPREHENSIVE    Collection Time: 08/18/22  6:00 PM   Result Value Ref Range    Sodium 141 136 - 145 mmol/L    Potassium 3.6 3.5 - 5.1 mmol/L    Chloride 105 97 - 108 mmol/L    CO2 27 21 - 32 mmol/L    Anion gap 9 5 - 15 mmol/L    Glucose 105 (H) 65 - 100 mg/dL    BUN 14 6 - 20 mg/dL    Creatinine 0.79 0.55 - 1.02 mg/dL    BUN/Creatinine ratio 18 12 - 20      GFR est AA >60 >60 ml/min/1.73m2    GFR est non-AA >60 >60 ml/min/1.73m2    Calcium 9.6 8.5 - 10.1 mg/dL    Bilirubin, total 0.2 0.2 - 1.0 mg/dL    AST (SGOT) 21 15 - 37 U/L    ALT (SGPT) 28 12 - 78 U/L    Alk. phosphatase 91 45 - 117 U/L    Protein, total 7.1 6.4 - 8.2 g/dL    Albumin 3.9 3.5 - 5.0 g/dL    Globulin 3.2 2.0 - 4.0 g/dL    A-G Ratio 1.2 1.1 - 2.2     TROPONIN-HIGH SENSITIVITY    Collection Time: 08/18/22  6:00 PM   Result Value Ref Range    Troponin-High Sensitivity 8 0 - 51 ng/L       Radiologic Studies  CTA CHEST W OR W WO CONT   Final Result      1. No evidence of pulmonary embolism. No evidence of acute process in the chest.   2. Multiple borderline enlarged bilateral axillary lymph nodes, which may be   reactive or related to known history of CLL. Comparison with prior imaging if   available is recommended to assess for stability. 3. Moderate calcification of the LAD. CT Results  (Last 48 hours)                 08/18/22 1924  CTA CHEST W OR W WO CONT Final result    Impression:      1. No evidence of pulmonary embolism. No evidence of acute process in the chest.   2. Multiple borderline enlarged bilateral axillary lymph nodes, which may be   reactive or related to known history of CLL. Comparison with prior imaging if   available is recommended to assess for stability. 3. Moderate calcification of the LAD. Narrative:  EXAM:  CTA CHEST W OR W WO CONT       INDICATION: SOB, tachycardia, recent lumbar surgery, concern for PE       COMPARISON: None. CONTRAST:  100 mL of Isovue-370.   ? Technique: Following the uneventful intravenous administration of contrast, thin   axial images were obtained through the chest. Coronal and sagittal   reconstructions were generated. MIP image reconstructions were also performed. CT dose reduction was achieved through use of a standardized protocol tailored   for this examination and automatic exposure control for dose modulation. ? Findings:   Vascular:   No evidence of acute or chronic pulmonary embolism. The pulmonary arteries are   normal in size.  The thoracic aorta is normal in size. Chest:   Lungs/Pleura: No evidence of consolidation, pleural effusion, or pneumothorax. No suspicious pulmonary nodules. Axilla/Soft Tissue: No pathologic axillary adenopathy. There are multiple   borderline enlarged bilateral axillary lymph nodes, largest measuring 10 mm   short axis. Mediastinum: Left upper extremity PICC with tip terminating in the proximal SVC. The heart is normal in size. No pericardial effusion. Moderate calcification of   the LAD. No pathologic mediastinal or hilar adenopathy. Calcified right hilar   lymph nodes noted. Upper Abdomen: The visualized upper abdomen appears normal.       Bones: No evidence of acute fracture, dislocation, or aggressive osseous   abnormality. Partially visualized thoracolumbar posterior spinal fusion hardware   extending superiorly to T10. CXR Results  (Last 48 hours)      None          ------------------------------------------------------------------------------------------------------------  The exam and treatment you received in the Emergency Department were for an urgent problem and are not intended as complete care. It is important that you follow-up with a doctor, nurse practitioner, or physician assistant to:  (1) confirm your diagnosis,  (2) re-evaluation of changes in your illness and treatment, and  (3) for ongoing care. Please take your discharge instructions with you when you go to your follow-up appointment. If you have any problem arranging a follow-up appointment, contact the Emergency Department. If your symptoms become worse or you do not improve as expected and you are unable to reach your health care provider, please return to the Emergency Department. We are available 24 hours a day. If a prescription has been provided, please have it filled as soon as possible to prevent a delay in treatment.  If you have any questions or reservations about taking the medication due to side effects or interactions with other medications, please call your primary care provider or contact the ER.

## 2022-08-20 LAB
ATRIAL RATE: 94 BPM
CALCULATED P AXIS, ECG09: 35 DEGREES
CALCULATED R AXIS, ECG10: -3 DEGREES
CALCULATED T AXIS, ECG11: 0 DEGREES
DIAGNOSIS, 93000: NORMAL
P-R INTERVAL, ECG05: 140 MS
Q-T INTERVAL, ECG07: 354 MS
QRS DURATION, ECG06: 82 MS
QTC CALCULATION (BEZET), ECG08: 442 MS
VENTRICULAR RATE, ECG03: 94 BPM

## 2022-09-09 ENCOUNTER — APPOINTMENT (OUTPATIENT)
Dept: PHYSICAL THERAPY | Age: 62
End: 2022-09-09

## 2022-09-16 ENCOUNTER — HOSPITAL ENCOUNTER (OUTPATIENT)
Dept: PHYSICAL THERAPY | Age: 62
Discharge: HOME | End: 2022-09-16
Payer: COMMERCIAL

## 2022-09-16 PROCEDURE — 97161 PT EVAL LOW COMPLEX 20 MIN: CPT | Performed by: PHYSICAL THERAPIST

## 2022-09-16 NOTE — THERAPY EVALUATION
W . [address] [place] [address], [address] [place] [address] [place] [address]  Phone: [phone]   Fax: [phone]    PT INITIAL EVALUATION NOTE - [facility] 2-15  Plan of Care/Statement of Necessity for Physical Therapy Services  2-15    Patient Name: Makeda Glez  Date:2022  : 1960  [x]  Patient  Verified  Provider#: [id number]  Payor: Hoda Vizcaino / Plan: [address] / Product Type: RUPA /    Referral source: Sergio Lennon MD   In time:210 pm   Out time:240 pm   Total Treatment Time (min): 30 minutes  Total Timed Codes (min): 0  1:1 Treatment Time ([address] only): 0   Visit #: 1      Start of Care: 2022      Onset Date: 2022     Treatment Area/Diagnosis: Other low back pain [M54.59]    SUBJECTIVE  Pain Level (0-10 scale): 6              Best:4         Worst:  10  Description: low back stiffness with numb pain in right foot. Any medication changes, allergies to medications, adverse drug reactions, diagnosis change, or new procedure performed?: [] No    [x] Yes (see summary sheet for update)    Subjective:    Chief complaint of post op back pain, persistent foot paresthesia/foot drop s/p most recent lumbar fusion 2022. PLOF: SPC, right AFO. Mechanism of Injury: chronic  Previous Treatment/Compliance: multiple back surgeries, melanoma/chronic leukemia  PMHx: depression, HTN  Surgical Hx: 2022. Prior Hospitalization: see medical history   Medications: Verified on Patient Summary List  Work Hx: working as realtor. Living Situation: help at home. 2 story condo. Pt Goals: walk for longer distances. Barriers: none  Motivation: good. Substance use: non smoker  Cognition: A & O x 4        OBJECTIVE/EXAMINATION  Ortho:   Observation: WN, WD:  In no distress. Posture:  relatively flat TL lumbar spine. Scar line well healed. Functional Mobility:        Gait:  right steppage gait. No assistive device used.        Stairs: + handrail without reciprocal pattern      Balance:  unable on right. Palpation: TTP along TL paraspinals into buttock. Spine:   TRUNK ROM:     Flexion:                         [] N/A  []WNL  []Minimal  [x]Moderate  [] Major   Extension:                     [] N/A  []WNL  []Minimal  []Moderate  [x] Major    Right Lateral Flexion:    [] N/A  []WNL  []Minimal  [x]Moderate  [] Major    Left Lateral Flexion:   [] N/A  []WNL  []Minimal  [x]Moderate  [] Major   Additional comments: mild pain at end range. LE AROM:    Hip AROM:  Right      [] N/A  [x]WNL  []Minimal  []Moderate  [] Major              Left         [] N/A  [x]WNL  []Minimal  []Moderate  [] Major   Note:  mild pain at end range ER/IR  Strength:  RLE: hip IR: -4/5, hip abductors:  3/5        LLE:  hip abductors:  3/5     Flexibility :  significant HS length. Transitional movements:  sit supine:  indp                                           Supine sit: inpd                                           Rolling:  inpd                                           Sit to stand: moderate use of UEs. Neurological: Reflexes / Sensations: asensate over dorsal right foot. Special Tests: slump/SLR:  negative. With   [] TE   [] TA   [] neuro   [] other: Patient Education: [] Review   [] Progressed/Changed HEP based on:   [] positioning   [] body mechanics   [] transfers   [] heat/ice application    [] other:      Other Objective/Functional Measures:   TUG: WNL    Pain Level (0-10 scale) post treatment: 6    ASSESSMENT/Key Information/Changes in Function:   58 yo female with chief complaint of low back pain and persistent neurogenic foot pain. In addition, she has recently had Covid 23 and is being treated for a post op infection. Problem List: pain affecting function, decrease ROM, decrease strength, and impaired gait/ balance    Short Term Goals: To be accomplished in 4 weeks:   1.   Inpd with HEP               2.  Improve quality of gait on even surface . Long Term Goals: To be accomplished in 8 weeks:   1. Patient can ambulate > 15 minutes without rest as to be a community Ambulator              2.  Increase hip strength by 1 grade as to more efficiency walk. Patient Goal (s): walk without having to sit so much.     Evaluation Complexity History LOW Complexity : Zero comorbidities / personal factors that will impact the outcome / POC; Examination LOW Complexity : 1-2 Standardized tests and measures addressing body structure, function, activity limitation and / or participation in recreation  ;Presentation LOW Complexity : Stable, uncomplicated  ;Clinical Decision Making Other outcome measures [facility] 76.65%  MEDIUM  Overall Complexity Rating: LOW      Patient / Family readiness to learn indicated by: asking questions and interest  Persons(s) to be included in education: patient (P)  Barriers to Learning/Limitations: None  Patient Self Reported Health Status: good  Rehabilitation Potential: good  Patient/ Caregiver education and instruction: activity modification, brace/ splint application, and exercises      PLAN:  Treatment Plan may include any combination of the following: Therapeutic exercise, Therapeutic activities, Neuromuscular re-education, Gait/balance training, Manual therapy, and Patient education  HEP Issued:  none issued today. Frequency / Duration: Patient to be seen 2 times per week for 8 weeks. [x]  Plan of care has been reviewed with PTA    Certification Period: 9/16/2022  to  12/16/2022    Hanna Burciaga, PT 9/16/2022     ________________________________________________________________________    I certify that the above Therapy Services are being furnished while the patient is under my care. I agree with the treatment plan and certify that this therapy is necessary.     Physician's Signature:____________________  Date:____________Time: _________            Janelle Cheema MD

## 2022-09-21 ENCOUNTER — APPOINTMENT (OUTPATIENT)
Dept: PHYSICAL THERAPY | Age: 62
End: 2022-09-21
Payer: COMMERCIAL

## 2022-09-26 ENCOUNTER — HOSPITAL ENCOUNTER (OUTPATIENT)
Dept: PHYSICAL THERAPY | Age: 62
Discharge: HOME | End: 2022-09-26
Payer: COMMERCIAL

## 2022-09-26 PROCEDURE — 97110 THERAPEUTIC EXERCISES: CPT | Performed by: PHYSICAL THERAPIST

## 2022-09-28 ENCOUNTER — HOSPITAL ENCOUNTER (OUTPATIENT)
Dept: PHYSICAL THERAPY | Age: 62
Discharge: HOME | End: 2022-09-28
Payer: COMMERCIAL

## 2022-09-28 PROCEDURE — 97032 APPL MODALITY 1+ESTIM EA 15: CPT | Performed by: PHYSICAL THERAPIST

## 2022-09-28 PROCEDURE — 97110 THERAPEUTIC EXERCISES: CPT | Performed by: PHYSICAL THERAPIST

## 2022-09-28 NOTE — PROGRESS NOTES
PT DAILY TREATMENT NOTE - [facility] 2-15    Patient Name: Les Rob  Date:2022  : 1960  [x]  Patient  Verified  Payor: BLUE CROSS / Plan: [address] / Product Type: RUPA /    In time 145 pm   Out time: 230  pm   Total Treatment Time (min): 45  minutes  Total Timed Codes (min): 33     Visit #:  6    Treatment Area: Other low back pain [M54.59]    SUBJECTIVE  Pain Level (0-10 scale): 7  Any medication changes, allergies to medications, adverse drug reactions, diagnosis change, or new procedure performed?: [x] No    [] Yes (see summary sheet for update)  Subjective functional status/changes:   [] No changes reported  Persistent fatigue from today's program.       OBJECTIVE    Electrical stimulation via biofeedback :  anterior tibialis 10:10 with active assist .    X 10 minutes. Rationale:  cutaneous feedback to facilitate contraction of anterior tibialis. 33 min Therapeutic Exercise:  [] See flow sheet :   Rationale: increase ROM, increase strength, and improve coordination to improve the patients ability to move with efficiently           With   [] TE   [] TA   [] neuro   [] other: Patient Education: [x] Review HEP:  sit to stands on edge of bed. [] Progressed/Changed HEP based on:   [] positioning   [] body mechanics   [] transfers   [] heat/ice application    [x] other:  how to transition from supine to sitting . Other Objective/Functional Measures: Speed of gait:  .83 meters/second. Pain Level (0-10 scale) post treatment: 8    ASSESSMENT/Changes in Function:   Required frequent rest breaks due to Covid fatigue. Needed minimal verbal cueing due to previous episodes of PT. Patient will continue to benefit from skilled PT services to modify and progress therapeutic interventions, address functional mobility deficits, address ROM deficits, address strength deficits, and analyze and cue movement patterns to attain remaining goals.      [x]  See Plan of Care  []  See progress note/recertification  []  See Discharge Summary         Progress towards goals / Updated goals:  Short Term Goals: To be accomplished in 4 weeks:              1.  Inpd with HEP               2.  Improve quality of gait on even surface . Long Term Goals: To be accomplished in 8 weeks:              1.  Patient can ambulate > 15 minutes without rest as to be a community Ambulator              2.  Increase hip strength by 1 grade as to more efficiency walk. Patient Goal (s): walk without having to sit so much.           PLAN  [x]  Upgrade activities as tolerated     [x]  Continue plan of care  []  Update interventions per flow sheet       []  Discharge due to:_  []  Other:_      April Joy-Rosa, PT 9/28/2022

## 2022-10-04 ENCOUNTER — APPOINTMENT (OUTPATIENT)
Dept: PHYSICAL THERAPY | Age: 62
End: 2022-10-04
Payer: COMMERCIAL

## 2022-10-06 ENCOUNTER — HOSPITAL ENCOUNTER (OUTPATIENT)
Dept: PHYSICAL THERAPY | Age: 62
Discharge: HOME | End: 2022-10-06
Payer: COMMERCIAL

## 2022-10-06 PROCEDURE — 97530 THERAPEUTIC ACTIVITIES: CPT | Performed by: PHYSICAL THERAPIST

## 2022-10-06 PROCEDURE — 97110 THERAPEUTIC EXERCISES: CPT | Performed by: PHYSICAL THERAPIST

## 2022-10-06 NOTE — PROGRESS NOTES
PT DAILY TREATMENT NOTE - [facility] 2-15    Patient Name: Praful Segura  Date:10/6/2022  : 1960  [x]  Patient  Verified  Payor: Eber Pandey / Plan: Christopher Glover / Product Type: RUPA /    In time :  900 Out time: 1000  Total Treatment Time (min): 60 minutes  Total Timed Codes (min): 38 minutes     Visit #:  4    Treatment Area: Other low back pain [M54.59]    SUBJECTIVE  Pain Level (0-10 scale): 4  Any medication changes, allergies to medications, adverse drug reactions, diagnosis change, or new procedure performed?: [x] No    [] Yes (see summary sheet for update)  Subjective functional status/changes:   [] No changes reported  Persistent fatigue from today's program.       OBJECTIVE    Electrical stimulation via biofeedback :  anterior tibialis 10:10 with active assist .  .  X 10 minutes. Rationale:  cutaneous feedback to facilitate contraction of anterior tibialis. 15 min Therapeutic Activity:  []  See flow sheet :   Rationale: improve coordination, improve balance, and increase proprioception  to improve the patients ability to perform dynamic movements without LOB and with greater efficiency. 23 min Therapeutic Exercise:  [] See flow sheet :   Rationale: increase ROM, increase strength, and improve coordination to improve the patients ability to move with efficiently           With   [] TE   [] TA   [] neuro   [] other: Patient Education: [x] Review HEP:  sit to stands on edge of bed. [] Progressed/Changed HEP based on:   [] positioning   [] body mechanics   [] transfers   [] heat/ice application    [x] other:  how to transition from supine to sitting . Other Objective/Functional Measures: Speed of gait:  .89 meters/second. Right AFO. No assistive device. Pain Level (0-10 scale) post treatment: 8    ASSESSMENT/Changes in Function:   Continues to require frequent rest breaks due to Covid fatigue. No adverse reactions s/p session.       Patient will continue to benefit from skilled PT services to modify and progress therapeutic interventions, address functional mobility deficits, address ROM deficits, address strength deficits, and analyze and cue movement patterns to attain remaining goals. [x]  See Plan of Care  []  See progress note/recertification  []  See Discharge Summary         Progress towards goals / Updated goals:  Short Term Goals: To be accomplished in 4 weeks:              1.  Inpd with HEP , progressing. 2.  Improve quality of gait on even surface . Long Term Goals: To be accomplished in 8 weeks:              1.  Patient can ambulate > 15 minutes without rest as to be a community Ambulator              2.  Increase hip strength by 1 grade as to more efficiency walk. 3.  Speed of gait:  1.1 meters/ second. , progressing      Patient Goal (s): walk without having to sit so much.           PLAN  [x]  Upgrade activities as tolerated     [x]  Continue plan of care  []  Update interventions per flow sheet       []  Discharge due to:_  []  Other:_      April Joy-Rosa, PT 10/6/2022

## 2022-10-07 ENCOUNTER — HOSPITAL ENCOUNTER (OUTPATIENT)
Dept: PHYSICAL THERAPY | Age: 62
End: 2022-10-07
Payer: COMMERCIAL

## 2022-10-11 ENCOUNTER — HOSPITAL ENCOUNTER (OUTPATIENT)
Dept: PHYSICAL THERAPY | Age: 62
Discharge: HOME | End: 2022-10-11
Payer: COMMERCIAL

## 2022-10-11 PROCEDURE — 97530 THERAPEUTIC ACTIVITIES: CPT | Performed by: PHYSICAL THERAPIST

## 2022-10-11 PROCEDURE — 97110 THERAPEUTIC EXERCISES: CPT | Performed by: PHYSICAL THERAPIST

## 2022-10-11 NOTE — PROGRESS NOTES
PT DAILY TREATMENT NOTE - [facility] 2-15    Patient Name: Carmela Clifford  Date:10/11/2022  : 1960  [x]  Patient  Verified  Payor: Leonardo Mooney / Plan: [address] / Product Type: RUPA /    In time :  145 pm Out time: 230 pm   Total Treatment Time (min): 45 minutes  Total Timed Codes (min): 38 minutes     Visit #:  5    Treatment Area: Other low back pain [M54.59]    SUBJECTIVE  Pain Level (0-10 scale):  5   Any medication changes, allergies to medications, adverse drug reactions, diagnosis change, or new procedure performed?: [x] No    [] Yes (see summary sheet for update)  Subjective functional status/changes:   [] No changes reported    Not in a rush today. Forgot AFO. OBJECTIVE    Electrical stimulation via biofeedback :  anterior tibialis 10:10 with active assist .  .  X 5 minutes. Rationale:  cutaneous feedback to facilitate contraction of anterior tibialis. 15 min Therapeutic Activity:  []  See flow sheet :   Rationale: improve coordination, improve balance, and increase proprioception  to improve the patients ability to perform dynamic movements without LOB and with greater efficiency. 23 min Therapeutic Exercise:  [] See flow sheet :   Rationale: increase ROM, increase strength, and improve coordination to improve the patients ability to move with efficiently           With   [] TE   [] TA   [] neuro   [] other: Patient Education: [x] Review HEP:  sit to stands on edge of bed. [] Progressed/Changed HEP based on:   [] positioning   [] body mechanics   [] transfers   [] heat/ice application    [x] other:  how to transition from supine to sitting . Other Objective/Functional Measures: Speed of gait:  .89 meters/second. Right AFO. No assistive device. Pain Level (0-10 scale) post treatment: 4    ASSESSMENT/Changes in Function:   Less rest breaks today. No adverse reactions s/p session.       Patient will continue to benefit from skilled PT services to modify and progress therapeutic interventions, address functional mobility deficits, address ROM deficits, address strength deficits, and analyze and cue movement patterns to attain remaining goals. [x]  See Plan of Care  []  See progress note/recertification  []  See Discharge Summary         Progress towards goals / Updated goals:  Short Term Goals: To be accomplished in 4 weeks:              1.  Inpd with HEP , progressing. 2.  Improve quality of gait on even surface . Long Term Goals: To be accomplished in 8 weeks:              1.  Patient can ambulate > 15 minutes without rest as to be a community Ambulator              2.  Increase hip strength by 1 grade as to more efficiency walk. 3.  Speed of gait:  1.1 meters/ second. , progressing      Patient Goal (s): walk without having to sit so much.           PLAN  [x]  Upgrade activities as tolerated     [x]  Continue plan of care  []  Update interventions per flow sheet       []  Discharge due to:_  []  Other:_      April Joy-Rosa, PT 10/11/2022

## 2022-10-13 ENCOUNTER — HOSPITAL ENCOUNTER (OUTPATIENT)
Dept: PHYSICAL THERAPY | Age: 62
Discharge: HOME | End: 2022-10-13
Payer: COMMERCIAL

## 2022-10-13 PROCEDURE — 97110 THERAPEUTIC EXERCISES: CPT | Performed by: PHYSICAL THERAPIST

## 2022-10-13 PROCEDURE — 97530 THERAPEUTIC ACTIVITIES: CPT | Performed by: PHYSICAL THERAPIST

## 2022-10-13 NOTE — PROGRESS NOTES
PT DAILY TREATMENT NOTE - [facility] 2-15    Patient Name: Ananias Oppenheim  Date:10/13/2022  : 1960  [x]  Patient  Verified  Payor: Nina Kerr / Plan: [address] / Product Type: RUPA /    In time : 930 am Out time: 1030 am   Total Treatment Time (min): 60 minutes  Total Timed Codes (min): 42 minutes     Visit #:  6     Treatment Area: Other low back pain [M54.59]    SUBJECTIVE  Pain Level (0-10 scale):  4  Any medication changes, allergies to medications, adverse drug reactions, diagnosis change, or new procedure performed?: [x] No    [] Yes (see summary sheet for update)  Subjective functional status/changes:   [] No changes reported  I feel like my endurance is improving     OBJECTIVE    Electrical stimulation via biofeedback :  anterior tibialis 10:10 with active assist .  .  X 5 minutes. Rationale:  cutaneous feedback to facilitate contraction of anterior tibialis. 15 min Therapeutic Activity:  []  See flow sheet :   Rationale: improve coordination, improve balance, and increase proprioception  to improve the patients ability to perform dynamic movements without LOB and with greater efficiency. 27 min Therapeutic Exercise:  [] See flow sheet :   Rationale: increase ROM, increase strength, and improve coordination to improve the patients ability to move with efficiently           With   [] TE   [] TA   [] neuro   [] other: Patient Education: [x] Review HEP:  sit to stands on edge of bed. [] Progressed/Changed HEP based on:   [] positioning   [] body mechanics   [] transfers   [] heat/ice application    [x] other:  how to transition from supine to sitting . Other Objective/Functional Measures: Speed of gait:  .94 meters/second. Right AFO. No assistive device. Pain Level (0-10 scale) post treatment:  4     ASSESSMENT/Changes in Function:   Less rest breaks today. No adverse reactions s/p session.       Patient will continue to benefit from skilled PT services to modify and progress therapeutic interventions, address functional mobility deficits, address ROM deficits, address strength deficits, and analyze and cue movement patterns to attain remaining goals. [x]  See Plan of Care  []  See progress note/recertification  []  See Discharge Summary         Progress towards goals / Updated goals:  Short Term Goals: To be accomplished in 4 weeks:              1.  Inpd with HEP , progressing. 2.  Improve quality of gait on even surface . Long Term Goals: To be accomplished in 8 weeks:              1.  Patient can ambulate > 15 minutes without rest as to be a community Ambulator              2.  Increase hip strength by 1 grade as to more efficiency walk. 3.  Speed of gait:  1.1 meters/ second. , progressing      Patient Goal (s): walk without having to sit so much.           PLAN  [x]  Upgrade activities as tolerated     [x]  Continue plan of care  []  Update interventions per flow sheet       []  Discharge due to:_  []  Other:_      April Joy-Rosa, PT 10/13/2022

## 2022-10-18 ENCOUNTER — APPOINTMENT (OUTPATIENT)
Dept: PHYSICAL THERAPY | Age: 62
End: 2022-10-18
Payer: COMMERCIAL

## 2022-10-19 ENCOUNTER — HOSPITAL ENCOUNTER (OUTPATIENT)
Dept: PHYSICAL THERAPY | Age: 62
Discharge: HOME | End: 2022-10-19
Payer: COMMERCIAL

## 2022-10-19 PROCEDURE — 97530 THERAPEUTIC ACTIVITIES: CPT | Performed by: PHYSICAL THERAPIST

## 2022-10-19 PROCEDURE — 97110 THERAPEUTIC EXERCISES: CPT | Performed by: PHYSICAL THERAPIST

## 2022-10-19 NOTE — PROGRESS NOTES
PT DAILY TREATMENT NOTE - [facility] 2-15    Patient Name: Praful Segura  Date:10/19/2022  : 1960  [x]  Patient  Verified  Payor: Eber Pandey / Plan: [address] / Product Type: RUPA /    In time : 250 pm  Out time: 330 pm    Total Treatment Time (min): 60 minutes  Total Timed Codes (min): 39  minutes     Visit #:  7     Treatment Area: Other low back pain [M54.59]    SUBJECTIVE  Pain Level (0-10 scale):  4  Any medication changes, allergies to medications, adverse drug reactions, diagnosis change, or new procedure performed?: [x] No    [] Yes (see summary sheet for update)  Subjective functional status/changes:   [] No changes reported    Right lower leg was burning so bad last night that patient reports she was unable to sleep. OBJECTIVE    Electrical stimulation via biofeedback :  anterior tibialis 10:10 with active assist .  .  X 0  minutes. Rationale:  cutaneous feedback to facilitate contraction of anterior tibialis. 12 min Therapeutic Activity:  []  See flow sheet :   Rationale: improve coordination, improve balance, and increase proprioception  to improve the patients ability to perform dynamic movements without LOB and with greater efficiency. 27 min Therapeutic Exercise:  [] See flow sheet :   Rationale: increase ROM, increase strength, and improve coordination to improve the patients ability to move with efficiently           With   [] TE   [] TA   [] neuro   [] other: Patient Education: [x] Review HEP:  sit to stands on edge of bed. [] Progressed/Changed HEP based on:   [] positioning   [] body mechanics   [] transfers   [] heat/ice application    [x] other:  how to transition from supine to sitting . Other Objective/Functional Measures: Speed of gait:  .94 meters/second. Right AFO. No assistive device. Pain Level (0-10 scale) post treatment:  4     ASSESSMENT/Changes in Function:   Less rest breaks today. No adverse reactions s/p session. Persistent right foot drop without change. Patient will continue to benefit from skilled PT services to modify and progress therapeutic interventions, address functional mobility deficits, address ROM deficits, address strength deficits, and analyze and cue movement patterns to attain remaining goals. [x]  See Plan of Care  []  See progress note/recertification  []  See Discharge Summary         Progress towards goals / Updated goals:  Short Term Goals: To be accomplished in 4 weeks:              1.  Inpd with HEP , progressing. 2.  Improve quality of gait on even surface . Long Term Goals: To be accomplished in 8 weeks:              1.  Patient can ambulate > 15 minutes without rest as to be a community Ambulator              2.  Increase hip strength by 1 grade as to more efficiency walk. 3.  Speed of gait:  1.1 meters/ second. , progressing . 94       Patient Goal (s): walk without having to sit so much. , progressing.         PLAN  [x]  Upgrade activities as tolerated     [x]  Continue plan of care  []  Update interventions per flow sheet       []  Discharge due to:_  []  Other:_      April Joy-Rosa, PT 10/19/2022

## 2022-10-19 NOTE — PROGRESS NOTES
[address] . [address] [place] [address]., [address]  [place] [address] [place] [address]  Phone: [phone]   Fax: [phone]    Progress Note    Name: Aleisha Diss   : 1960   MD: Americo Khan MD       Treatment Diagnosis: Other low back pain [M54.59]  Start of Care: 2022    Visits from Start of Care: 6  Missed Visits: 0    Summary of Care:  core stabilization, gait and balance activities     Assessment / Recommendations:     Persistent right foot drop with steppage gait pattern. No assistive device. Endurance is improving. Short Term Goals: To be accomplished in 4 weeks:              1.  Inpd with HEP , progressing. 2.  Improve quality of gait on even surface . Long Term Goals: To be accomplished in 8 weeks:              1.  Patient can ambulate > 15 minutes without rest as to be a community Ambulator              2.  Increase hip strength by 1 grade as to more efficiency walk. 3.  Speed of gait:  1.1 meters/ second. , progressing            Other: continue PT and current POC.          Hanna Burciaga, PT 10/19/2022

## 2022-10-20 ENCOUNTER — HOSPITAL ENCOUNTER (OUTPATIENT)
Dept: PHYSICAL THERAPY | Age: 62
Discharge: HOME | End: 2022-10-20
Payer: COMMERCIAL

## 2022-10-20 PROCEDURE — 97110 THERAPEUTIC EXERCISES: CPT | Performed by: PHYSICAL THERAPIST

## 2022-10-20 PROCEDURE — 97530 THERAPEUTIC ACTIVITIES: CPT | Performed by: PHYSICAL THERAPIST

## 2022-10-20 NOTE — PROGRESS NOTES
PT DAILY TREATMENT NOTE - [facility] 2-15    Patient Name: Gattis Duverney  Date:10/20/2022  : 1960  [x]  Patient  Verified  Payor: Haydee Dnoato / Plan: [address] / Product Type: RUPA /    In time : 900 am  Out time: 950 am    Total Treatment Time (min): 50 minutes  Total Timed Codes (min): 42  minutes     Visit #:  8     Treatment Area: Other low back pain [M54.59]    SUBJECTIVE  Pain Level (0-10 scale): Any medication changes, allergies to medications, adverse drug reactions, diagnosis change, or new procedure performed?: [x] No    [] Yes (see summary sheet for update)  Subjective functional status/changes:   [] No changes reported    Much less pain today. Not sure what causes the increases. OBJECTIVE    Electrical stimulation via biofeedback :  anterior tibialis 10:10 with active assist .  .  X 0  minutes. Rationale:  cutaneous feedback to facilitate contraction of anterior tibialis. 15 min Therapeutic Activity:  []  See flow sheet :   Rationale: improve coordination, improve balance, and increase proprioception  to improve the patients ability to perform dynamic movements without LOB and with greater efficiency. 27 min Therapeutic Exercise:  [] See flow sheet :   Rationale: increase ROM, increase strength, and improve coordination to improve the patients ability to move with efficiently           With   [] TE   [] TA   [] neuro   [] other: Patient Education: [x] Review HEP:  sit to stands on edge of bed. [] Progressed/Changed HEP based on:   [] positioning   [] body mechanics   [] transfers   [] heat/ice application    [x] other:  how to transition from supine to sitting . Other Objective/Functional Measures: Speed of gait:  1.3  meters/second. Right AFO. No assistive device. Pain Level (0-10 scale) post treatment:  4     ASSESSMENT/Changes in Function:   Less rest breaks today. No adverse reactions s/p session.     Persistent right foot drop without change. Patient will continue to benefit from skilled PT services to modify and progress therapeutic interventions, address functional mobility deficits, address ROM deficits, address strength deficits, and analyze and cue movement patterns to attain remaining goals. [x]  See Plan of Care  []  See progress note/recertification  []  See Discharge Summary         Progress towards goals / Updated goals:  Short Term Goals: To be accomplished in 4 weeks:              1.  Inpd with HEP , progressing. 2.  Improve quality of gait on even surface . , progressing   Long Term Goals: To be accomplished in 8 weeks:              1.  Patient can ambulate > 15 minutes without rest as to be a community Ambulator              2.  Increase hip strength by 1 grade as to more efficiency walk. 3.  Speed of gait:  1.1 meters/ second. , MET 10/20/2022       Patient Goal (s): Donnald Pucker without having to sit so much. , progressing.         PLAN  [x]  Upgrade activities as tolerated     [x]  Continue plan of care  []  Update interventions per flow sheet       []  Discharge due to:_  []  Other:_      April Joy-Rosa, PT 10/20/2022

## 2022-10-31 ENCOUNTER — APPOINTMENT (OUTPATIENT)
Dept: PHYSICAL THERAPY | Age: 62
End: 2022-10-31
Payer: COMMERCIAL

## 2022-11-01 ENCOUNTER — OFFICE VISIT (OUTPATIENT)
Dept: PODIATRY | Age: 62
End: 2022-11-01
Payer: COMMERCIAL

## 2022-11-01 VITALS
WEIGHT: 211 LBS | HEIGHT: 68 IN | TEMPERATURE: 97.5 F | BODY MASS INDEX: 31.98 KG/M2 | HEART RATE: 95 BPM | DIASTOLIC BLOOD PRESSURE: 79 MMHG | SYSTOLIC BLOOD PRESSURE: 135 MMHG | OXYGEN SATURATION: 98 %

## 2022-11-01 DIAGNOSIS — M21.371 RIGHT FOOT DROP: Primary | ICD-10-CM

## 2022-11-01 DIAGNOSIS — L60.0 INGROWN LEFT GREATER TOENAIL: ICD-10-CM

## 2022-11-01 PROCEDURE — 99203 OFFICE O/P NEW LOW 30 MIN: CPT | Performed by: PODIATRIST

## 2022-11-01 NOTE — PROGRESS NOTES
[facility]    Subjective:         Patient is a 59 y.o. female who is being seen as a new pt for left great toe pain. Patient states that she has had multiple back procedures, with resulting right foot drop. She states she is intermittently compliant with her AFO to the right lower extremity. She states this has altered her biomechanics. She believes she may have an ingrown toenail to the left. She has acute pain, recent level of 5 out of 10. She states pain is exacerbated with pressure. She denies any overt trauma. She denies any breaks skin or local/systemic signs infection. She denies any recent diagnostic testing to interrogate the area of concern. She denies any recent changes in her shoe gear but does admit to a decreased activity level due to the pain. She denies any other lower extremity complaints      Past Medical History:   Diagnosis Date    CLL (chronic lymphocytic leukemia) (HCC)     GERD (gastroesophageal reflux disease)     Hypertension     Melanoma ([address]      Past Surgical History:   Procedure Laterality Date    HX LUMBAR FUSION         History reviewed. No pertinent family history. Social History     Tobacco Use    Smoking status: Never    Smokeless tobacco: Never   Substance Use Topics    Alcohol use: Not on file     Allergies   Allergen Reactions    Iodinated Contrast Media Other (comments)    Tramadol Itching     Prior to Admission medications    Medication Sig Start Date End Date Taking? Authorizing Provider   ondansetron (ZOFRAN ODT) 4 mg disintegrating tablet Take 1 Tablet by mouth every eight (8) hours as needed for Nausea or Vomiting. 8/18/22   Grace Cruz NP       Review of Systems   Constitutional: Negative. HENT: Negative. Eyes: Negative. Respiratory: Negative. Cardiovascular: Negative. Gastrointestinal: Negative. Endocrine: Negative. Genitourinary: Negative.     Musculoskeletal:  Positive for arthralgias, back pain and myalgias. Skin: Negative. Allergic/Immunologic: Positive for immunocompromised state. Neurological:  Positive for numbness. Hematological: Negative. Psychiatric/Behavioral: Negative. All other systems reviewed and are negative. Objective:     Visit Vitals  /79 (BP 1 Location: Right upper arm, BP Patient Position: Sitting, BP Cuff Size: Adult)   Pulse 95   Temp 97.5 °F (36.4 °C) (Temporal)   Ht 5' 8\" (1.727 m)   Wt 211 lb (95.7 kg)   SpO2 98%   BMI 32.08 kg/m²       Physical Exam  Vitals reviewed. Constitutional:       Appearance: She is obese. Cardiovascular:      Pulses:           Dorsalis pedis pulses are 2+ on the right side and 2+ on the left side. Posterior tibial pulses are 2+ on the right side and 2+ on the left side. Pulmonary:      Effort: Pulmonary effort is normal.   Musculoskeletal:      Right lower leg: Edema present. Left lower leg: Edema present. Right foot: Decreased range of motion. Foot drop present. No deformity, bunion or Charcot foot. Left foot: Normal range of motion. Deformity present. No bunion, Charcot foot or foot drop. Feet:      Right foot:      Protective Sensation: 10 sites tested. 10 sites sensed. Skin integrity: Skin integrity normal.      Toenail Condition: Right toenails are normal.      Left foot:      Protective Sensation: 10 sites tested. 10 sites sensed. Skin integrity: Skin integrity normal.      Toenail Condition: Left toenails are ingrown. Lymphadenopathy:      Lower Body: No right inguinal adenopathy. No left inguinal adenopathy. Skin:     General: Skin is warm. Capillary Refill: Capillary refill takes 2 to 3 seconds. Neurological:      Mental Status: She is alert and oriented to person, place, and time. Psychiatric:         Mood and Affect: Mood and affect normal.         Behavior: Behavior is cooperative.        Data Review: No results found for this or any previous visit (from the past 24 hour(s)). Impression:       ICD-10-CM ICD-9-CM    1. Right foot drop  M21.371 736.79       2. Ingrown left greater toenail  L60.0 703.0             Recommendation:     Patient seen and evaluated in the office  Discussed and educated patient regarding her current medical condition, possible etiologies of her symptoms and various treatment options  A slant back procedure was performed the offending nail border of the left great toe without incident. Patient tolerated well no dressing was needed. Instructed patient that if her symptoms persist, she may need a more permanent procedure. Patient verbalized understanding  Encouraged compliance with her AFO to the left lower extremity due to her foot drop. Educated patient that this would improve her biomechanics and decrease the chance of forming the ingrown to the left in the future. Verbalized understanding of all discussed and reviewed today in the office          Ted Butler.  Neisha Núñez, [address] [facility], [address] [place] [address] and [facility]  [address] [place] [address]  O: [phone]  F: [phone]  C: [phone]

## 2022-11-01 NOTE — PROGRESS NOTES
1. \"Have you been to the ER, urgent care clinic since your last visit? Hospitalized since your last visit? \" No    2. \"Have you seen or consulted any other health care providers outside of the [address] [place] [address] since your last visit? \" No     3. For patients aged 39-70: Has the patient had a colonoscopy / FIT/ Cologuard? Yes - no Care Gap present      If the patient is female:    4. For patients aged 41-77: Has the patient had a mammogram within the past 2 years? Yes - no Care Gap present      5. For patients aged 21-65: Has the patient had a pap smear?  No    Chief Complaint   Patient presents with    Ingrown Toenail     Left great toe

## 2022-11-03 ENCOUNTER — APPOINTMENT (OUTPATIENT)
Dept: PHYSICAL THERAPY | Age: 62
End: 2022-11-03

## 2022-11-09 ENCOUNTER — APPOINTMENT (OUTPATIENT)
Dept: PHYSICAL THERAPY | Age: 62
End: 2022-11-09

## 2022-11-10 ENCOUNTER — APPOINTMENT (OUTPATIENT)
Dept: PHYSICAL THERAPY | Age: 62
End: 2022-11-10